# Patient Record
Sex: FEMALE | Race: WHITE | Employment: OTHER | URBAN - NONMETROPOLITAN AREA
[De-identification: names, ages, dates, MRNs, and addresses within clinical notes are randomized per-mention and may not be internally consistent; named-entity substitution may affect disease eponyms.]

---

## 2022-11-09 ENCOUNTER — OFFICE VISIT (OUTPATIENT)
Dept: PRIMARY CARE CLINIC | Age: 27
End: 2022-11-09
Payer: OTHER GOVERNMENT

## 2022-11-09 ENCOUNTER — HOSPITAL ENCOUNTER (OUTPATIENT)
Dept: GENERAL RADIOLOGY | Age: 27
Discharge: HOME | End: 2022-11-11
Payer: OTHER GOVERNMENT

## 2022-11-09 VITALS
SYSTOLIC BLOOD PRESSURE: 106 MMHG | HEART RATE: 93 BPM | TEMPERATURE: 97.9 F | OXYGEN SATURATION: 98 % | WEIGHT: 164 LBS | HEIGHT: 70 IN | BODY MASS INDEX: 23.48 KG/M2 | DIASTOLIC BLOOD PRESSURE: 80 MMHG

## 2022-11-09 DIAGNOSIS — M25.511 ACUTE PAIN OF RIGHT SHOULDER: Primary | ICD-10-CM

## 2022-11-09 DIAGNOSIS — M25.511 ACUTE PAIN OF RIGHT SHOULDER: ICD-10-CM

## 2022-11-09 DIAGNOSIS — S40.011A CONTUSION OF RIGHT SHOULDER, INITIAL ENCOUNTER: ICD-10-CM

## 2022-11-09 PROBLEM — E55.9 VITAMIN D DEFICIENCY: Status: ACTIVE | Noted: 2022-11-09

## 2022-11-09 PROBLEM — F41.9 ANXIETY DISORDER, UNSPECIFIED: Status: ACTIVE | Noted: 2022-11-09

## 2022-11-09 PROBLEM — E03.9 HYPOTHYROIDISM: Status: ACTIVE | Noted: 2022-11-09

## 2022-11-09 PROCEDURE — 99203 OFFICE O/P NEW LOW 30 MIN: CPT | Performed by: FAMILY MEDICINE

## 2022-11-09 PROCEDURE — 99212 OFFICE O/P EST SF 10 MIN: CPT | Performed by: FAMILY MEDICINE

## 2022-11-09 PROCEDURE — 73030 X-RAY EXAM OF SHOULDER: CPT

## 2022-11-09 PROCEDURE — MISC295 DJO ARM SLING WITH SWATHE: Performed by: FAMILY MEDICINE

## 2022-11-09 RX ORDER — FLUOXETINE HYDROCHLORIDE 40 MG/1
40 CAPSULE ORAL DAILY
COMMUNITY

## 2022-11-09 RX ORDER — BUSPIRONE HYDROCHLORIDE 15 MG/1
15 TABLET ORAL 3 TIMES DAILY
COMMUNITY

## 2022-11-09 RX ORDER — MELOXICAM 7.5 MG/1
7.5 TABLET ORAL DAILY PRN
Qty: 30 TABLET | Refills: 0 | Status: SHIPPED | OUTPATIENT
Start: 2022-11-09

## 2022-11-09 RX ORDER — LEVOTHYROXINE SODIUM 0.1 MG/1
100 TABLET ORAL DAILY
COMMUNITY

## 2022-11-09 RX ORDER — CYCLOBENZAPRINE HCL 5 MG
5-10 TABLET ORAL 3 TIMES DAILY PRN
Qty: 30 TABLET | Refills: 0 | Status: SHIPPED | OUTPATIENT
Start: 2022-11-09 | End: 2022-11-19

## 2022-11-09 ASSESSMENT — PATIENT HEALTH QUESTIONNAIRE - PHQ9
SUM OF ALL RESPONSES TO PHQ QUESTIONS 1-9: 0
SUM OF ALL RESPONSES TO PHQ9 QUESTIONS 1 & 2: 0
2. FEELING DOWN, DEPRESSED OR HOPELESS: 0
1. LITTLE INTEREST OR PLEASURE IN DOING THINGS: 0
SUM OF ALL RESPONSES TO PHQ QUESTIONS 1-9: 0

## 2022-11-09 NOTE — PROGRESS NOTES
[facility]             [address], [facility] [address] [facility] [address]                      Telephone [phone]             Fax [phone]     Ina Mock  :  1995  Age:  23 y.o. MRN:  [id number]  Date of visit:  2022       Assessment and Plan:    1. Acute pain of right shoulder  2. Contusion of right shoulder, initial encounter  I reviewed the results of the x-rays done today with the patient. Mobic and Flexeril were prescribed:  - meloxicam (MOBIC) 7.5 MG tablet; Take 1 tablet by mouth daily as needed for Pain  Dispense: 30 tablet; Refill: 0  - cyclobenzaprine (FLEXERIL) 5 MG tablet; Take 1-2 tablets by mouth 3 times daily as needed for Muscle spasms  Dispense: 30 tablet; Refill: 0    She was placed in a sling:  - [address] [place] [address]    Printed information regarding Learning About RICE (Rest, Ice, Compression, and Elevation) was provided to the patient with her after visit summary. She was advised not to drive or operate machinery while taking muscle relaxers. She was also advised not to take this medication in combination with alcohol. She was advised to follow up if symptoms worsen or do not resolve. Subjective:    Ina Mock is a 23 y.o. female who presents to [facility] today (2022) for evaluation of:  Shoulder Injury (Pt fell on L shoulder last night, and while using her L shoulder now it makes her whole back hurt. )      She states that she fell onto her right shoulder last night. She was walking her dog, and her hand was caught in the dog's collar. She has pain in the right shoulder today with movement. She has taken Ibuprofen for the pain, which has not helped. She is left-hand dominant.       She has the following problem list:  Patient Active Problem List   Diagnosis    Anxiety disorder, unspecified    Hypothyroidism    Vitamin D deficiency        Current medications are:  Outpatient Medications Marked as Taking for the 11/9/22 encounter (Office Visit) with Ajit Dinero MD   Medication Sig Dispense Refill    busPIRone (BUSPAR) 15 MG tablet Take 15 mg by mouth 3 times daily      FLUoxetine (PROZAC) 40 MG capsule Take 40 mg by mouth daily      levothyroxine (SYNTHROID) 100 MCG tablet Take 100 mcg by mouth Daily          She has No Known Allergies. She  reports that she has never smoked. She has never used smokeless tobacco.      Objective:    Vitals:    11/09/22 1605   BP: 106/80   Pulse: 93   Temp: 97.9 °F (36.6 °C)   TempSrc: Tympanic   SpO2: 98%   Weight: 164 lb (74.4 kg)   Height: 5' 10\" (1.778 m)     Body mass index is 23.53 kg/m². Well-nourished, well-developed female, healthy-appearing, alert, cooperative, and in no acute distress. The right shoulder has reduced range of motion. There is moderate tenderness to palpation of the right upper arm and the right upper back. .  There is no deformity or skin changes of the right shoulder. X-ray results were reviewed with the patient:  XR SHOULDER RIGHT (MIN 2 VIEWS)    Result Date: 11/9/2022  EXAMINATION: THREE XRAY VIEWS OF THE RIGHT SHOULDER 11/9/2022 4:54 pm COMPARISON: None. HISTORY: ORDERING SYSTEM PROVIDED HISTORY: Acute pain of right shoulder TECHNOLOGIST PROVIDED HISTORY: pain in right shoulder--fell last night walking her dog Reason for Exam: Pain posterior shoulder slip and fell with dog FINDINGS: Right shoulder: No fracture or dislocation is detected. The acromioclavicular joint and glenohumeral joints are unremarkable. No concerning lytic or sclerotic lesion is identified. The visualized part of the lung appear unremarkable.      No fracture or dislocation               (Please note that portions of this note were completed with a voice-recognition program. Efforts were made to edit the dictation but occasionally words are mis-transcribed.)

## 2022-11-10 NOTE — PATIENT INSTRUCTIONS
Warning:  One or more of the medications that you have been prescribed may cause drowsiness and impair your ability to operate vehicles or machinery. Do not drive or operate machinery while taking muscle relaxers. Do not use in combination with alcohol.

## 2022-12-12 ENCOUNTER — OFFICE VISIT (OUTPATIENT)
Dept: PRIMARY CARE CLINIC | Age: 27
End: 2022-12-12
Payer: OTHER GOVERNMENT

## 2022-12-12 VITALS
BODY MASS INDEX: 24.98 KG/M2 | RESPIRATION RATE: 18 BRPM | HEART RATE: 122 BPM | TEMPERATURE: 99.8 F | WEIGHT: 174.5 LBS | HEIGHT: 70 IN | OXYGEN SATURATION: 99 %

## 2022-12-12 DIAGNOSIS — J06.9 VIRAL URI WITH COUGH: Primary | ICD-10-CM

## 2022-12-12 DIAGNOSIS — R50.9 FEVER, UNSPECIFIED FEVER CAUSE: ICD-10-CM

## 2022-12-12 LAB
INFLUENZA A ANTIGEN, POC: NEGATIVE
INFLUENZA B ANTIGEN, POC: NEGATIVE
LOT EXPIRE DATE: NORMAL
LOT KIT NUMBER: NORMAL
SARS-COV-2, POC: NORMAL
VALID INTERNAL CONTROL: NORMAL
VENDOR AND KIT NAME POC: NORMAL

## 2022-12-12 PROCEDURE — 99213 OFFICE O/P EST LOW 20 MIN: CPT | Performed by: STUDENT IN AN ORGANIZED HEALTH CARE EDUCATION/TRAINING PROGRAM

## 2022-12-12 PROCEDURE — 87428 SARSCOV & INF VIR A&B AG IA: CPT | Performed by: STUDENT IN AN ORGANIZED HEALTH CARE EDUCATION/TRAINING PROGRAM

## 2022-12-12 PROCEDURE — PBSHW POCT COVID-19 & INFLUENZA A/B: Performed by: STUDENT IN AN ORGANIZED HEALTH CARE EDUCATION/TRAINING PROGRAM

## 2022-12-12 PROCEDURE — 99212 OFFICE O/P EST SF 10 MIN: CPT | Performed by: STUDENT IN AN ORGANIZED HEALTH CARE EDUCATION/TRAINING PROGRAM

## 2022-12-12 RX ORDER — NAPROXEN 500 MG/1
250 TABLET ORAL 2 TIMES DAILY PRN
Qty: 20 TABLET | Refills: 0 | Status: SHIPPED | OUTPATIENT
Start: 2022-12-12

## 2022-12-12 RX ORDER — BENZONATATE 100 MG/1
100 CAPSULE ORAL 2 TIMES DAILY PRN
Qty: 20 CAPSULE | Refills: 0 | Status: SHIPPED | OUTPATIENT
Start: 2022-12-12 | End: 2022-12-19

## 2022-12-12 RX ORDER — HYDROXYZINE HYDROCHLORIDE 25 MG/1
TABLET, FILM COATED ORAL
COMMUNITY
Start: 2022-08-15

## 2022-12-12 RX ORDER — TRAZODONE HYDROCHLORIDE 50 MG/1
TABLET ORAL
COMMUNITY
Start: 2022-09-12

## 2022-12-12 RX ORDER — OMEPRAZOLE 40 MG/1
CAPSULE, DELAYED RELEASE ORAL
COMMUNITY
Start: 2022-10-17

## 2022-12-12 NOTE — PROGRESS NOTES
[facility]             [address], [place], [address]                        Telephone [phone]             Fax [phone]       Ina De La Cruz  :  1995  Age:  33 y.o. MRN:  [id number]  Date of visit:  2022     Assessment and Plan:    1. Viral URI with cough  Likely viral URI with cough we will trial naproxen for body aches as ibuprofen does not seem to be cutting it. We will also give Tessalon pearls for cough. Recommend returning to the clinic if symptoms worsen or fail to improve. Negative for COVID-19 and influenza at this time. - naproxen (NAPROSYN) 500 MG tablet; Take 0.5 tablets by mouth 2 times daily as needed for Pain  Dispense: 20 tablet; Refill: 0  - benzonatate (TESSALON) 100 MG capsule; Take 1 capsule by mouth 2 times daily as needed for Cough  Dispense: 20 capsule; Refill: 0    2. Fever, unspecified fever cause  - POCT COVID-19 & Influenza A/B      Subjective:    Ina De La Cruz is a 33 y.o. female who presents to [facility] today (2022) for evaluation of:  Fever (Started yesterday, cough, body aches, sore throat, chills, ear pain, headache. )    23-year-old female presents to the urgent care for evaluation of fever that started yesterday as well as cough, body aches, sore throat. She states that she has been trying ibuprofen and over-the-counter cough and cold medicine without much benefit. She states that she continues to have body aches even though she is taking ibuprofen 800 currently. She states that her child was sick and had a fever earlier this week but did not have as severe symptoms as she is having currently. Chief Complaint   Patient presents with    Fever     Started yesterday, cough, body aches, sore throat, chills, ear pain, headache.       She has the following problem list:  Patient Active Problem List   Diagnosis    Anxiety disorder, unspecified    Hypothyroidism    Vitamin D deficiency        Review of Systems   Constitutional:  Positive for chills, fatigue and fever. HENT:  Positive for congestion, rhinorrhea, sinus pressure and sore throat. Negative for ear pain, postnasal drip and trouble swallowing. Eyes:  Negative for pain and visual disturbance. Respiratory:  Positive for cough. Negative for shortness of breath. Cardiovascular:  Negative for chest pain and palpitations. Gastrointestinal:  Negative for abdominal pain, blood in stool, constipation, diarrhea, nausea and vomiting. Genitourinary:  Negative for dysuria and urgency. Musculoskeletal:  Positive for myalgias. Skin:  Negative for rash and wound. Neurological:  Negative for dizziness and headaches. Psychiatric/Behavioral:  Negative for dysphoric mood. The patient is not nervous/anxious. Current medications are:  Current Outpatient Medications   Medication Sig Dispense Refill    traZODone (DESYREL) 50 MG tablet TAKE 1 TO 2 TABLETS BY MOUTH EVERY NIGHT AS NEEDED FOR INSOMNIA      omeprazole (PRILOSEC) 40 MG delayed release capsule TAKE 1 CAPSULE BY MOUTH EVERY DAY      hydrOXYzine HCl (ATARAX) 25 MG tablet       naproxen (NAPROSYN) 500 MG tablet Take 0.5 tablets by mouth 2 times daily as needed for Pain 20 tablet 0    benzonatate (TESSALON) 100 MG capsule Take 1 capsule by mouth 2 times daily as needed for Cough 20 capsule 0    busPIRone (BUSPAR) 15 MG tablet Take 15 mg by mouth 3 times daily      FLUoxetine (PROZAC) 40 MG capsule Take 40 mg by mouth daily      levothyroxine (SYNTHROID) 100 MCG tablet Take 100 mcg by mouth Daily       No current facility-administered medications for this visit. She has No Known Allergies. She  reports that she has never smoked.  She has never used smokeless tobacco.      Objective:    Vitals:    12/12/22 1102   Pulse: (!) 122   Resp: 18   Temp: 99.8 °F (37.7 °C)   TempSrc: Tympanic   SpO2: 99%   Weight: 174 lb 8 oz (79.2 kg)   Height: 5' 10\" (1.778 m)     Body mass index is 25.04 kg/m². Physical Exam  Vitals and nursing note reviewed. Constitutional:       General: She is not in acute distress. Appearance: She is well-developed. She is not diaphoretic. HENT:      Head: Normocephalic and atraumatic. Right Ear: External ear normal.      Left Ear: External ear normal.      Nose: Congestion and rhinorrhea present. Mouth/Throat:      Pharynx: Posterior oropharyngeal erythema present. No oropharyngeal exudate. Eyes:      General: No scleral icterus. Right eye: No discharge. Left eye: No discharge. Conjunctiva/sclera: Conjunctivae normal.   Cardiovascular:      Rate and Rhythm: Normal rate and regular rhythm. Heart sounds: Normal heart sounds. No murmur heard. Pulmonary:      Effort: Pulmonary effort is normal.      Breath sounds: Normal breath sounds. No wheezing or rhonchi. Musculoskeletal:      Cervical back: Normal range of motion. Skin:     General: Skin is warm and dry. Findings: No erythema or rash. Neurological:      Mental Status: She is alert and oriented to person, place, and time. Cranial Nerves: No cranial nerve deficit. Psychiatric:         Behavior: Behavior normal.         Thought Content:  Thought content normal.         Judgment: Judgment normal.             (Please note that portions of this note were completed with a voice-recognition program. Efforts were made to edit the dictation but occasionally words are mis-transcribed.)

## 2022-12-13 ASSESSMENT — ENCOUNTER SYMPTOMS
NAUSEA: 0
CONSTIPATION: 0
BLOOD IN STOOL: 0
SHORTNESS OF BREATH: 0
ABDOMINAL PAIN: 0
RHINORRHEA: 1
COUGH: 1
VOMITING: 0
DIARRHEA: 0
SORE THROAT: 1
EYE PAIN: 0
SINUS PRESSURE: 1
TROUBLE SWALLOWING: 0